# Patient Record
Sex: MALE | Race: WHITE | NOT HISPANIC OR LATINO | URBAN - METROPOLITAN AREA
[De-identification: names, ages, dates, MRNs, and addresses within clinical notes are randomized per-mention and may not be internally consistent; named-entity substitution may affect disease eponyms.]

---

## 2024-09-21 ENCOUNTER — EMERGENCY (EMERGENCY)
Facility: HOSPITAL | Age: 69
LOS: 1 days | Discharge: HOME | End: 2024-09-21
Admitting: EMERGENCY MEDICINE
Payer: SELF-PAY

## 2024-09-21 VITALS
TEMPERATURE: 98 F | HEART RATE: 50 BPM | WEIGHT: 227.08 LBS | DIASTOLIC BLOOD PRESSURE: 70 MMHG | RESPIRATION RATE: 18 BRPM | SYSTOLIC BLOOD PRESSURE: 185 MMHG | OXYGEN SATURATION: 96 %

## 2024-09-21 VITALS
OXYGEN SATURATION: 96 % | HEART RATE: 45 BPM | RESPIRATION RATE: 18 BRPM | SYSTOLIC BLOOD PRESSURE: 148 MMHG | TEMPERATURE: 97 F | DIASTOLIC BLOOD PRESSURE: 65 MMHG

## 2024-09-21 LAB
ALBUMIN SERPL ELPH-MCNC: 3.8 G/DL — SIGNIFICANT CHANGE UP (ref 3.4–5)
ALP SERPL-CCNC: 81 U/L — SIGNIFICANT CHANGE UP (ref 40–120)
ALT FLD-CCNC: 52 U/L — HIGH (ref 12–42)
ANION GAP SERPL CALC-SCNC: 10 MMOL/L — SIGNIFICANT CHANGE UP (ref 9–16)
AST SERPL-CCNC: 34 U/L — SIGNIFICANT CHANGE UP (ref 15–37)
BASOPHILS # BLD AUTO: 0.06 K/UL — SIGNIFICANT CHANGE UP (ref 0–0.2)
BASOPHILS NFR BLD AUTO: 0.6 % — SIGNIFICANT CHANGE UP (ref 0–2)
BILIRUB SERPL-MCNC: 0.9 MG/DL — SIGNIFICANT CHANGE UP (ref 0.2–1.2)
BUN SERPL-MCNC: 24 MG/DL — HIGH (ref 7–23)
CALCIUM SERPL-MCNC: 8.6 MG/DL — SIGNIFICANT CHANGE UP (ref 8.5–10.5)
CHLORIDE SERPL-SCNC: 108 MMOL/L — SIGNIFICANT CHANGE UP (ref 96–108)
CO2 SERPL-SCNC: 25 MMOL/L — SIGNIFICANT CHANGE UP (ref 22–31)
CREAT SERPL-MCNC: 1 MG/DL — SIGNIFICANT CHANGE UP (ref 0.5–1.3)
D DIMER BLD IA.RAPID-MCNC: <187 NG/ML DDU — SIGNIFICANT CHANGE UP
EGFR: 80 ML/MIN/1.73M2 — SIGNIFICANT CHANGE UP
EOSINOPHIL # BLD AUTO: 0.11 K/UL — SIGNIFICANT CHANGE UP (ref 0–0.5)
EOSINOPHIL NFR BLD AUTO: 1.2 % — SIGNIFICANT CHANGE UP (ref 0–6)
GLUCOSE SERPL-MCNC: 161 MG/DL — HIGH (ref 70–99)
HCT VFR BLD CALC: 43.2 % — SIGNIFICANT CHANGE UP (ref 39–50)
HGB BLD-MCNC: 14.5 G/DL — SIGNIFICANT CHANGE UP (ref 13–17)
IMM GRANULOCYTES NFR BLD AUTO: 0.2 % — SIGNIFICANT CHANGE UP (ref 0–0.9)
LYMPHOCYTES # BLD AUTO: 1.83 K/UL — SIGNIFICANT CHANGE UP (ref 1–3.3)
LYMPHOCYTES # BLD AUTO: 19.6 % — SIGNIFICANT CHANGE UP (ref 13–44)
MCHC RBC-ENTMCNC: 31 PG — SIGNIFICANT CHANGE UP (ref 27–34)
MCHC RBC-ENTMCNC: 33.6 GM/DL — SIGNIFICANT CHANGE UP (ref 32–36)
MCV RBC AUTO: 92.3 FL — SIGNIFICANT CHANGE UP (ref 80–100)
MONOCYTES # BLD AUTO: 0.81 K/UL — SIGNIFICANT CHANGE UP (ref 0–0.9)
MONOCYTES NFR BLD AUTO: 8.7 % — SIGNIFICANT CHANGE UP (ref 2–14)
NEUTROPHILS # BLD AUTO: 6.51 K/UL — SIGNIFICANT CHANGE UP (ref 1.8–7.4)
NEUTROPHILS NFR BLD AUTO: 69.7 % — SIGNIFICANT CHANGE UP (ref 43–77)
NRBC # BLD: 0 /100 WBCS — SIGNIFICANT CHANGE UP (ref 0–0)
PLATELET # BLD AUTO: 192 K/UL — SIGNIFICANT CHANGE UP (ref 150–400)
POTASSIUM SERPL-MCNC: 4.5 MMOL/L — SIGNIFICANT CHANGE UP (ref 3.5–5.3)
POTASSIUM SERPL-SCNC: 4.5 MMOL/L — SIGNIFICANT CHANGE UP (ref 3.5–5.3)
PROT SERPL-MCNC: 6.8 G/DL — SIGNIFICANT CHANGE UP (ref 6.4–8.2)
RBC # BLD: 4.68 M/UL — SIGNIFICANT CHANGE UP (ref 4.2–5.8)
RBC # FLD: 12.4 % — SIGNIFICANT CHANGE UP (ref 10.3–14.5)
SODIUM SERPL-SCNC: 143 MMOL/L — SIGNIFICANT CHANGE UP (ref 132–145)
TROPONIN I, HIGH SENSITIVITY RESULT: 6 NG/L — SIGNIFICANT CHANGE UP
WBC # BLD: 9.34 K/UL — SIGNIFICANT CHANGE UP (ref 3.8–10.5)
WBC # FLD AUTO: 9.34 K/UL — SIGNIFICANT CHANGE UP (ref 3.8–10.5)

## 2024-09-21 PROCEDURE — 71046 X-RAY EXAM CHEST 2 VIEWS: CPT | Mod: 26

## 2024-09-21 PROCEDURE — 99285 EMERGENCY DEPT VISIT HI MDM: CPT

## 2024-09-21 NOTE — ED PROVIDER NOTE - PROGRESS NOTE DETAILS
Spoke with patient and wife about lab and imaging results, answered all questions. Recommended an additional troponin given episode of lightheadedness and AMS as well as bradycardia on exam, but patient and wife declined as they have an appointment to go to. Discussed that the patient may have a cardiac abnormality causing his symptoms and that further workup was warranted, patient understands but prefers to leave and follow up with [place] providers [place] [facility] [place]. Patient aware that if his symptoms occur again, he needs to come back to the ED immediately for further evaluation. Patient denies any symptoms at time of discharge including HA, CP, SOB, dizziness, back pain. Patient alert, speaking in full sentences, no resp distress, walking with stable gait without assistance. Patient requesting and consenting to discharge at this time, declining further workup,

## 2024-09-21 NOTE — ED PROVIDER NOTE - IN ACCORDANCE WITH [place] STATE LAW, WE OFFER EVERY PATIENT A HEPATITIS C TEST. WOULD YOU LIKE TO BE TESTED TODAY?
Opt out Erythromycin Counseling:  I discussed with the patient the risks of erythromycin including but not limited to GI upset, allergic reaction, drug rash, diarrhea, increase in liver enzymes, and yeast infections.

## 2024-09-21 NOTE — ED PROVIDER NOTE - PATIENT PORTAL LINK FT
You can access the FollowMyHealth Patient Portal offered by [facility] by registering at the following website: [email or web address]. By joining [facility]’s FollowMyHealth portal, you will also be able to view your health information using other applications (apps) compatible with our system.

## 2024-09-21 NOTE — ED PROVIDER NOTE - OBJECTIVE STATEMENT
70yo M with PMhx HTN , DM BIBEMS for episode of hypoglycemia around noon today. Per the wife, patient was walking when he became lightheaded and felt unwell. She helped him to their hotel where he had water, but continued to feel unwell so EMS was activated. At this time, patient became mildly confused and could not tell EMS his name. EMS reporting BG in 50s so dextrose was given with improvement of BG to 100s. Patient's wife then reports he returned to his baseline mental status. Per patient, he uses insulin for his DM, took his usual morning dose, but ate a smaller breakfast than usual. Also states he was walking around more than usual. Patient is visiting from [place] [facility] [place], landed in [place] 2 days ago. Patient denies CP, SOB, room spinning dizziness at time of symptoms. Denies fever, chills, cough, LE edema, N/V, abdominal pain, back pain, numbness, weakness, inability to walk.

## 2024-09-21 NOTE — ED PROVIDER NOTE - NS ED ROS FT
Denies fevers, chills, nausea, vomiting, diarrhea, constipation, abdominal pain, urinary symptoms, chest pain, palpitations, shortness of breath, dyspnea on exertion, syncope, cough/URI symptoms, headache, numbness, focal deficits, visual changes, gait or balance changes, dizziness

## 2024-09-21 NOTE — ED PROVIDER NOTE - NSFOLLOWUPINSTRUCTIONS_ED_ALL_ED_FT
If you are a smoker, it is important for your health to stop smoking. Please be aware that second hand smoke is also harmful.
Today you were seen for hypoglycemia. You had an ECG, chest xray and labs done today during your evaluation. Your symptoms were likely due to low blood sugar.      - Keep a record of your symptoms.   - Drink plenty of fluids to stay well hydrated. Get plenty of rest.    - Avoid anything that seems to exacerbate the symptoms (certain activities or movements or positions, certain foods or beverages, etc).     Please arrange a follow up appointment with your primary care provider and cardiologist as soon as possible within 1-2 weeks to discuss your symptoms.     Please return to the Emergency Department for persistent, worsening or new symptoms including severe chest pain, shortness of breath, difficulty breathing, passing out or early passing out, excessive nausea/vomiting and inability to tolerate food/liquid, excessive sweating, or any other serious concerns.

## 2024-09-21 NOTE — ED PROVIDER NOTE - CLINICAL SUMMARY MEDICAL DECISION MAKING FREE TEXT BOX
68 yo M with DM , HTN here with episode of hypoglycemia that manifested as lightheadedness, fatigue, confusion. Symptoms resolved with dextrose administration by EMS. Patient at baseline in the ED, denies any symptoms at this time. Symptoms all likely 2/2 fatigue from travel and hypoglycemia. Given age and risk factors, would also like to ensure no cardiac component given symptoms manifested during exertion. Will get trop , ECG to assess for ACS or arrythmia. Patient with bradycardia in ED but denies any lightheadedness, dizziness or CP. labs to assess for anemia, electrolyte derangement. CXR to assess for PNA, abnormal mediastinum. Ddimer given recent age and travel from [place] to ensure no PE although less likely given no hypoxia, pleuritic chest pain or CP/SOB. Patient otherwise well appearing, requesting discharge without imaging /labs/ECG but discussed importance of these items with the patient and his wife to ensure there was no cardiac event resulting his symptoms in combination with the low sugar. Patient and wife amendable to stay for initial testing.

## 2024-09-21 NOTE — ED PROVIDER NOTE - PHYSICAL EXAMINATION
VITAL SIGNS: I have reviewed nursing notes and confirm.  CONSTITUTIONAL: Well-developed; well-nourished; in no acute distress.  SKIN: No acute rash.  HEAD: Normocephalic; atraumatic.  CARD: No extremity cyanosis. Bradycardic but regular rhythm. S1S2.  RESP: Speaks in full, clear sentences. CTA lele. No adventitious BS.  EXT: Moves all extremities normally. No LE edema b/l.   NEURO: No focal deficits. Fluent speech. AOx3. CN2-12 intact including PERRL, no nystagmus. 5/5 strength in all extremities. Sensation equal and intact b/l. Normal finger to nose. +truncal stability. Neg pronator drift. Normal gait. No ataxia.   PSYCH: Cooperative, appropriate. Mood and affect wnl.

## 2024-09-21 NOTE — ED ADULT TRIAGE NOTE - CHIEF COMPLAINT QUOTE
here for a hypoglycemic event. Last PO intake was yesterday morning, FS in the field 54 - was given oral dextrose repeat FS on arrival 100

## 2024-09-21 NOTE — ED ADULT NURSE NOTE - OBJECTIVE STATEMENT
here for a hypoglycemic event. Last PO intake was yesterday morning, FS in the field 54 - was given oral dextrose repeat FS on arrival 100. denies chest pain, denies SOB, denies LOC.

## 2024-09-21 NOTE — ED PROVIDER NOTE - ED STEMI HIDDEN
hide
no palpitations/no paroxysmal nocturnal dyspnea/no orthopnea/no peripheral edema/no chest pain/no dyspnea on exertion

## 2024-09-25 DIAGNOSIS — R00.1 BRADYCARDIA, UNSPECIFIED: ICD-10-CM

## 2024-09-25 DIAGNOSIS — E11.649 TYPE 2 DIABETES MELLITUS WITH HYPOGLYCEMIA WITHOUT COMA: ICD-10-CM

## 2024-09-25 DIAGNOSIS — I10 ESSENTIAL (PRIMARY) HYPERTENSION: ICD-10-CM

## 2024-09-25 DIAGNOSIS — E16.2 HYPOGLYCEMIA, UNSPECIFIED: ICD-10-CM
